# Patient Record
Sex: FEMALE | Race: WHITE | URBAN - METROPOLITAN AREA
[De-identification: names, ages, dates, MRNs, and addresses within clinical notes are randomized per-mention and may not be internally consistent; named-entity substitution may affect disease eponyms.]

---

## 2022-10-27 ENCOUNTER — OFFICE VISIT (OUTPATIENT)
Dept: URBAN - METROPOLITAN AREA CLINIC 96 | Facility: CLINIC | Age: 77
End: 2022-10-27

## 2025-07-22 ENCOUNTER — OFFICE VISIT (OUTPATIENT)
Dept: URBAN - METROPOLITAN AREA CLINIC 96 | Facility: CLINIC | Age: 80
End: 2025-07-22
Payer: COMMERCIAL

## 2025-07-22 ENCOUNTER — DASHBOARD ENCOUNTERS (OUTPATIENT)
Age: 80
End: 2025-07-22

## 2025-07-22 ENCOUNTER — TELEPHONE ENCOUNTER (OUTPATIENT)
Dept: URBAN - METROPOLITAN AREA CLINIC 96 | Facility: CLINIC | Age: 80
End: 2025-07-22

## 2025-07-22 DIAGNOSIS — R11.2 NAUSEA AND VOMITING, UNSPECIFIED VOMITING TYPE: ICD-10-CM

## 2025-07-22 DIAGNOSIS — K44.9 HIATAL HERNIA: ICD-10-CM

## 2025-07-22 DIAGNOSIS — Z85.9 PERSONAL HISTORY OF MALIGNANT NEOPLASM, UNSPECIFIED: ICD-10-CM

## 2025-07-22 DIAGNOSIS — R19.7 DIARRHEA, UNSPECIFIED TYPE: ICD-10-CM

## 2025-07-22 DIAGNOSIS — R93.5 ABNORMAL ABDOMINAL CT SCAN: ICD-10-CM

## 2025-07-22 DIAGNOSIS — I25.10 ATHEROSCLEROTIC HEART DISEASE OF NATIVE CORONARY ARTERY WITHOUT ANGINA PECTORIS: ICD-10-CM

## 2025-07-22 DIAGNOSIS — K57.90 DIVERTICULOSIS: ICD-10-CM

## 2025-07-22 PROBLEM — 397881000: Status: ACTIVE | Noted: 2025-07-22

## 2025-07-22 PROCEDURE — 99204 OFFICE O/P NEW MOD 45 MIN: CPT | Performed by: INTERNAL MEDICINE

## 2025-07-22 RX ORDER — ATORVASTATIN CALCIUM 40 MG/1
TABLET, FILM COATED ORAL
Qty: 0 | Refills: 0 | Status: ACTIVE | COMMUNITY
Start: 2016-07-22

## 2025-07-22 RX ORDER — DICLOFENAC SODIUM 10 MG/G
GEL TOPICAL
Qty: 0 | Refills: 0 | Status: ACTIVE | COMMUNITY
Start: 2016-11-22

## 2025-07-22 RX ORDER — ASPIRIN 81 MG/1
TABLET, CHEWABLE ORAL
Qty: 0 | Refills: 0 | Status: ACTIVE | COMMUNITY
Start: 1900-01-01

## 2025-07-22 RX ORDER — ZOLPIDEM TARTRATE 10 MG/1
TABLET, FILM COATED ORAL
Qty: 0 | Refills: 0 | Status: ACTIVE | COMMUNITY
Start: 2016-12-01

## 2025-07-22 RX ORDER — MONTELUKAST SODIUM 10 MG/1
TABLET, FILM COATED ORAL
Qty: 0 | Refills: 0 | Status: ACTIVE | COMMUNITY
Start: 2016-11-02

## 2025-07-22 RX ORDER — FLUTICASONE PROPIONATE AND SALMETEROL 50; 250 UG/1; UG/1
POWDER RESPIRATORY (INHALATION)
Qty: 0 | Refills: 0 | Status: ACTIVE | COMMUNITY
Start: 2016-12-16

## 2025-07-22 NOTE — HPI-TODAY'S VISIT:
Marlene Miller, an 80-year-old female, presented for an acute visit due to recurrent diarrhea following a recent emergency room evaluation. She reported that her symptoms began with nausea, vomiting, and diarrhea, which led to dehydration and an ER visit. After initial improvement, her diarrhea returned on the day of this visit, described as watery stools and regular rectal discharge. She denied blood in her stools, recent sick contacts, travel, or antibiotic use, and mentioned a mild fever about a week ago.  She discussed recent medication changes, including stopping Cymbalta, which she recognized as a possible contributor to her symptoms. She referenced recent blood work and an abdominal and pelvic CT scan from her ER visit. Marlene is actively trying to maintain her fluid intake. Her medical history includes coronary artery disease with stent placement and a history of cancer treated with early surgical resection. She does not smoke or use alcohol regularly.   Discharged from ER with no stool testing, discharged with Augmentin.

## 2025-07-22 NOTE — HPI-TODAY'S VISIT:
80-year-old female (I saw 2017) previously remotely seen in clinic by Dr. Kwok 3/22/2019 for bloating issues.  Prior EGD with Dr. Kwok 10/30/2018 with medium sized hiatal hernia, irregular Z-line, benign-appearing esophageal stenosis.  Gastric biopsies negative for H. pylori, negative for intestinal metaplasia.  GE junction biopsies with reflux associated changes, negative for Kessler's.  Colonoscopy by Dr. Kwok 11/13/2018 with 10 mm polyp 40 mm proximal to the anus removed with hot snare, complete resection.  Pathology with inflammatory polyp.  Patient recently seen at [facility] 7/18/2025 complaining of nausea, vomiting, diarrhea.  Laboratory studies white cell count 5.8, hemoglobin 11.5, platelets 494.  AST 12, ALT 21, total bilirubin 0.8. CT abdomen pelvis with contrast demonstrates severe cardiac enlargement, coronary artery calcification are evident.  Small sliding-type hiatal hernia evidence with mucosal hyperemia and submucosal edema involving the sigmoid colon and rectum that can be seen in the setting of moderate infectious/inflammatory proctocolitis.  Diverticulosis is present without evidence of diverticulitis and prominent stool seen throughout the colon.  No evidence of bowel obstruction or perforation. (see chart for reviewed records).

## 2025-07-23 ENCOUNTER — LAB OUTSIDE AN ENCOUNTER (OUTPATIENT)
Dept: URBAN - METROPOLITAN AREA CLINIC 96 | Facility: CLINIC | Age: 80
End: 2025-07-23

## 2025-07-28 LAB
ADENOVIRUS F 40/41: NOT DETECTED
CAMPYLOBACTER: NOT DETECTED
CLOSTRIDIUM DIFFICILE: NOT DETECTED
ENTAMOEBA HISTOLYTICA: NOT DETECTED
ENTEROAGGREGATIVE E.COLI: NOT DETECTED
ENTEROTOXIGENIC E.COLI: NOT DETECTED
ESCHERICHIA COLI O157: NOT DETECTED
GIARDIA LAMBLIA: NOT DETECTED
NOROVIRUS GI/GII: NOT DETECTED
ROTAVIRUS A: NOT DETECTED
SALMONELLA SPP.: NOT DETECTED
SHIGA-LIKE TOXIN PRODUCING E.COLI: NOT DETECTED
SHIGELLA SPP. / ENTEROINVASIVE E.COLI: NOT DETECTED
VIBRIO PARAHAEMOLYTICUS: NOT DETECTED
VIBRIO SPP.: NOT DETECTED
YERSINIA ENTEROCOLITICA: NOT DETECTED

## 2025-07-29 ENCOUNTER — WEB ENCOUNTER (OUTPATIENT)
Dept: URBAN - METROPOLITAN AREA CLINIC 98 | Facility: CLINIC | Age: 80
End: 2025-07-29

## 2025-08-05 ENCOUNTER — WEB ENCOUNTER (OUTPATIENT)
Dept: URBAN - METROPOLITAN AREA CLINIC 98 | Facility: CLINIC | Age: 80
End: 2025-08-05

## 2025-08-05 ENCOUNTER — WEB ENCOUNTER (OUTPATIENT)
Dept: URBAN - METROPOLITAN AREA CLINIC 96 | Facility: CLINIC | Age: 80
End: 2025-08-05

## 2025-08-06 ENCOUNTER — WEB ENCOUNTER (OUTPATIENT)
Dept: URBAN - METROPOLITAN AREA CLINIC 98 | Facility: CLINIC | Age: 80
End: 2025-08-06

## 2025-08-12 ENCOUNTER — TELEPHONE ENCOUNTER (OUTPATIENT)
Dept: URBAN - METROPOLITAN AREA CLINIC 96 | Facility: CLINIC | Age: 80
End: 2025-08-12